# Patient Record
(demographics unavailable — no encounter records)

---

## 2024-11-18 NOTE — PHYSICAL EXAM
[Normal Mood and Affect] : normal mood and affect [Oriented] : oriented [Able to Communicate] : able to communicate [Well Developed] : well developed [Well Nourished] : well nourished [Right] : right shoulder [NL (0-180)] : full passive forward flexion 0-180 degrees [NL (0-90)] : full external rotation 0-90 degrees [NL (45)] : extension 45 degrees [Extension] : extension [Rotation to left] : rotation to left [Rotation to right] : rotation to right [4 ___] : forward flexion 4[unfilled]/5 [4___] : abduction 4[unfilled]/5 [5___] : external rotation 5[unfilled]/5 [Disc space narrowing] : Disc space narrowing [Bilateral] : shoulder bilaterally [There are no fractures, subluxations or dislocations. No significant abnormalities are seen] : There are no fractures, subluxations or dislocations. No significant abnormalities are seen [Type 3 acromion] : Type 3 acromion [FreeTextEntry1] : Mild DDD at C5-C6 [de-identified] : left lateral flexion 20 degrees [de-identified] : left lateral rotation 75 degrees [de-identified] : right lateral flexion 20 degrees [TWNoteComboBox6] : right lateral rotation 60 degrees [] : no AC joint tenderness [FreeTextEntry9] : IR T8 on the right, T6 on the left. [de-identified] : + Michelle

## 2024-11-18 NOTE — HISTORY OF PRESENT ILLNESS
[5] : 5 [Sharp] : sharp [Meds] : meds [Full time] : Work status: full time [FreeTextEntry7] : bicept [de-identified] : 4/25/22 [de-identified] : dr guthrie [de-identified] : none [8] : 8 [de-identified] : 11/18/24:  Return visit for a 58 year old female here today complaining of recurrent  neck pain radiating down both arms to both forearms x last 1 months duration. Constant and daily. Taking motrin 600mg prn with slight relief only. Having wake up pain at night.  Has been seeing pain management and underwent a JORGE A x2 this past 8/24 and 10/24 w/ little relief.          Last cortisone injection to the right shoulder in August of 2022.  08/11/22:  Returns today for right shoulder MRI results. C/o recurring rt shoulder pain since her cortisone injection x 3 1/2 months ago. Pt is considering surgical repair.  MRI R Shoulder: 06/23/2022 Trace subacromial/subdeltoid bursitis.  Rotator cuff tendinosis as described above, with a small focal full-thickness nonretracted tear of the supraspinatus tendon, new compared to the MR study from April 2021.  04/25/22:  Returns today three months after right shoulder cortisone injection. Known to have partial RCT as per MRI in 4/21.  01/27/22: Returns today with recurrent right shoulder pain X last 2-3 weeks about nine months after cortisone injection. has been moving and lifting boxes which hurts.  4/26/21 Here for MRI rt shoulder results. Now 2 weeks s/p cortisone injection. had only partial relief this time. Can't lie on rt side at night. NO wake up pain at night.  04/12/21: Returns today for left knee MRI results. Still has discomfort along the medial joint line. Also here with recurrent right shoulder pain with cortisone injections given to the right shoulder and left knee about three months ago. Rt shoulder did well following cortisone injection x 3 months ago and now pain has recurred x last 2 weeks ago. Can't lie on rt side at night. has occ. wake up pain.  01/14/21: Return visit for a 54 year old RHD female here today for spontaneous onset of right shoulder pain about two months ago. Pain when reaching overhead and when laying on that side. No injury or fall.   PMH: No previous right shoulder pain. Left shoulder pain a few months ago treated with cortisone injection.  4/19/19 Return visit for this 53 yo female c/o spon. onset of bilateral knee pain Lt>> rt x last 4 days duration assoc. w/ stairclimbing.Was limping. Was taking Naprosyn bid prn which helped. PMH: was having similar type pain x last 6 months duration which she was living with.  s/p meniscal tear rt knee x m any years ago. Never operated. [] : no [FreeTextEntry1] : right shoulder [FreeTextEntry5] : Reports neck pain and shooting CHELO shoulder pain for 1 month, radiating pain down CHELO arms. Denies N/T. Had JORGE A on 10/17/24 with Dr. Payan, reports some relief. reoccurring headache for 6 weeks  [de-identified] : lifting

## 2024-11-18 NOTE — PHYSICAL EXAM
[Normal Mood and Affect] : normal mood and affect [Oriented] : oriented [Able to Communicate] : able to communicate [Well Developed] : well developed [Well Nourished] : well nourished [Right] : right shoulder [NL (0-180)] : full passive forward flexion 0-180 degrees [NL (0-90)] : full external rotation 0-90 degrees [NL (45)] : extension 45 degrees [Extension] : extension [Rotation to left] : rotation to left [Rotation to right] : rotation to right [4 ___] : forward flexion 4[unfilled]/5 [4___] : abduction 4[unfilled]/5 [5___] : external rotation 5[unfilled]/5 [Disc space narrowing] : Disc space narrowing [Bilateral] : shoulder bilaterally [There are no fractures, subluxations or dislocations. No significant abnormalities are seen] : There are no fractures, subluxations or dislocations. No significant abnormalities are seen [Type 3 acromion] : Type 3 acromion [FreeTextEntry1] : Mild DDD at C5-C6 [de-identified] : left lateral flexion 20 degrees [de-identified] : left lateral rotation 75 degrees [de-identified] : right lateral flexion 20 degrees [TWNoteComboBox6] : right lateral rotation 60 degrees [] : no AC joint tenderness [FreeTextEntry9] : IR T8 on the right, T6 on the left. [de-identified] : + Michelle

## 2024-11-18 NOTE — HISTORY OF PRESENT ILLNESS
[5] : 5 [Sharp] : sharp [Meds] : meds [Full time] : Work status: full time [FreeTextEntry7] : bicept [de-identified] : 4/25/22 [de-identified] : dr guthrie [de-identified] : none [8] : 8 [de-identified] : 11/18/24:  Return visit for a 58 year old female here today complaining of recurrent  neck pain radiating down both arms to both forearms x last 1 months duration. Constant and daily. Taking motrin 600mg prn with slight relief only. Having wake up pain at night.  Has been seeing pain management and underwent a JORGE A x2 this past 8/24 and 10/24 w/ little relief.          Last cortisone injection to the right shoulder in August of 2022.  08/11/22:  Returns today for right shoulder MRI results. C/o recurring rt shoulder pain since her cortisone injection x 3 1/2 months ago. Pt is considering surgical repair.  MRI R Shoulder: 06/23/2022 Trace subacromial/subdeltoid bursitis.  Rotator cuff tendinosis as described above, with a small focal full-thickness nonretracted tear of the supraspinatus tendon, new compared to the MR study from April 2021.  04/25/22:  Returns today three months after right shoulder cortisone injection. Known to have partial RCT as per MRI in 4/21.  01/27/22: Returns today with recurrent right shoulder pain X last 2-3 weeks about nine months after cortisone injection. has been moving and lifting boxes which hurts.  4/26/21 Here for MRI rt shoulder results. Now 2 weeks s/p cortisone injection. had only partial relief this time. Can't lie on rt side at night. NO wake up pain at night.  04/12/21: Returns today for left knee MRI results. Still has discomfort along the medial joint line. Also here with recurrent right shoulder pain with cortisone injections given to the right shoulder and left knee about three months ago. Rt shoulder did well following cortisone injection x 3 months ago and now pain has recurred x last 2 weeks ago. Can't lie on rt side at night. has occ. wake up pain.  01/14/21: Return visit for a 54 year old RHD female here today for spontaneous onset of right shoulder pain about two months ago. Pain when reaching overhead and when laying on that side. No injury or fall.   PMH: No previous right shoulder pain. Left shoulder pain a few months ago treated with cortisone injection.  4/19/19 Return visit for this 51 yo female c/o spon. onset of bilateral knee pain Lt>> rt x last 4 days duration assoc. w/ stairclimbing.Was limping. Was taking Naprosyn bid prn which helped. PMH: was having similar type pain x last 6 months duration which she was living with.  s/p meniscal tear rt knee x m any years ago. Never operated. [] : no [FreeTextEntry1] : right shoulder [FreeTextEntry5] : Reports neck pain and shooting CHELO shoulder pain for 1 month, radiating pain down CHELO arms. Denies N/T. Had JORGE A on 10/17/24 with Dr. Payan, reports some relief. reoccurring headache for 6 weeks  [de-identified] : lifting

## 2024-11-18 NOTE — PLAN
[TextEntry] : We discussed at length with the patient the options for treatment.  We discussed conservative care including physical therapy, acupuncture, massage therapy, and chiropractic care.  We discussed injection therapy and even surgical intervention should the patient fail conservative care.  We discussed risks, benefits, complications, alternatives, outcomes, expectations.  All questions answered.  Medrol dose pack was prescribed. Risks and benefits were explained including but not limited to the possibilities of gastritis, indigestion, and other GI side effects. It was also explained that in patients with a history of diabetes mellitus, it may temporarily elevate their blood sugars which should be monitored at home. A refill was also prescribed to take the subsequent week if needed.  Patient is being referred for physical therapy for various modalities.  Will obtain an MRI C-spine  If no improvement, refer to pain management.

## 2024-12-02 NOTE — PHYSICAL EXAM
[Normal Mood and Affect] : normal mood and affect [Oriented] : oriented [Able to Communicate] : able to communicate [Well Developed] : well developed [Well Nourished] : well nourished [NL (45)] : extension 45 degrees [Extension] : extension [Rotation to left] : rotation to left [Rotation to right] : rotation to right [Disc space narrowing] : Disc space narrowing [Right] : right shoulder [NL (0-180)] : full passive forward flexion 0-180 degrees [NL (0-90)] : full external rotation 0-90 degrees [4 ___] : forward flexion 4[unfilled]/5 [4___] : abduction 4[unfilled]/5 [5___] : external rotation 5[unfilled]/5 [Bilateral] : shoulder bilaterally [There are no fractures, subluxations or dislocations. No significant abnormalities are seen] : There are no fractures, subluxations or dislocations. No significant abnormalities are seen [Type 3 acromion] : Type 3 acromion [FreeTextEntry1] : Mild DDD at C5-C6 [de-identified] : left lateral flexion 20 degrees [de-identified] : left lateral rotation 75 degrees [de-identified] : right lateral flexion 20 degrees [TWNoteComboBox6] : right lateral rotation 60 degrees [] : no AC joint tenderness [FreeTextEntry9] : IR T8 on the right, T6 on the left. [de-identified] : + Michelle

## 2024-12-02 NOTE — PLAN
[TextEntry] : We discussed at length with the patient the options for treatment.  We discussed conservative care including physical therapy, acupuncture, massage therapy, and chiropractic care.  We discussed injection therapy and even surgical intervention should the patient fail conservative care.  We discussed risks, benefits, complications, alternatives, outcomes, expectations.  All questions answered.  Repeat MDP prn  Patient is being referred for physical therapy for various modalities.  If no improvement, refer to pain management.  Continue flexeril prn

## 2024-12-02 NOTE — HISTORY OF PRESENT ILLNESS
[8] : 8 [Sharp] : sharp [Meds] : meds [Full time] : Work status: full time [de-identified] : 12/02/24:   Here for cervical spine MRI results. Feeling better after taking a  MDP x 1. Has not restarted PT yet. Taking Flexeril prn at night which helps as well.  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 Constant and daily. Taking motrin 600mg prn with slight relief only. Having wake up pain at night.  Has been seeing pain management and underwent a JORGE A x2 this past 8/24 and 10/24 w/ little relief.          Last cortisone injection to the right shoulder in August of 2022.  08/11/22:  Returns today for right shoulder MRI results. C/o recurring rt shoulder pain since her cortisone injection x 3 1/2 months ago. Pt is considering surgical repair.  MRI R Shoulder: 06/23/2022 Trace subacromial/subdeltoid bursitis.  Rotator cuff tendinosis as described above, with a small focal full-thickness nonretracted tear of the supraspinatus tendon, new compared to the MR study from April 2021.  04/25/22:  Returns today three months after right shoulder cortisone injection. Known to have partial RCT as per MRI in 4/21.  01/27/22: Returns today with recurrent right shoulder pain X last 2-3 weeks about nine months after cortisone injection. has been moving and lifting boxes which hurts.  4/26/21 Here for MRI rt shoulder results. Now 2 weeks s/p cortisone injection. had only partial relief this time. Can't lie on rt side at night. NO wake up pain at night.  04/12/21: Returns today for left knee MRI results. Still has discomfort along the medial joint line. Also here with recurrent right shoulder pain with cortisone injections given to the right shoulder and left knee about three months ago. Rt shoulder did well following cortisone injection x 3 months ago and now pain has recurred x last 2 weeks ago. Can't lie on rt side at night. has occ. wake up pain.  01/14/21: Return visit for a 54 year old RHD female here today for spontaneous onset of right shoulder pain about two months ago. Pain when reaching overhead and when laying on that side. No injury or fall.   PMH: No previous right shoulder pain. Left shoulder pain a few months ago treated with cortisone injection.  4/19/19 Return visit for this 53 yo female c/o spon. onset of bilateral knee pain Lt>> rt x last 4 days duration assoc. w/ stairclimbing.Was limping. Was taking Naprosyn bid prn which helped. PMH: was having similar type pain x last 6 months duration which she was living with.  s/p meniscal tear rt knee x m any years ago. Never operated. [] : no [FreeTextEntry1] : right shoulder [FreeTextEntry5] : Reports neck pain and shooting CHELO shoulder pain for 1 month, radiating pain down CHELO arms. Denies N/T. Had JORGE A on 10/17/24 with Dr. Payan, reports some relief. reoccurring headache for 6 weeks  [de-identified] : lifting

## 2025-03-10 NOTE — HISTORY OF PRESENT ILLNESS
[Neck] : neck [7] : 7 [2] : 2 [Dull/Aching] : dull/aching [Constant] : constant [Sleep] : sleep [Ice] : ice [Heat] : heat [Bending forward] : bending forward [Full time] : Work status: full time [] : no [FreeTextEntry1] : BOTH SHOULDERS [FreeTextEntry9] : ANTI-INFLAMMATORY [de-identified] : ROTATION ON HEAD [de-identified] : 11/2022 [de-identified] : 10.2024 [TWNoteComboBox1] : 70%

## 2025-03-10 NOTE — ASSESSMENT
[FreeTextEntry1] : Interim history The patient returns with pain that has been treated adequately in the past with epidural steroid injections.  The patient's complaints are consistent with radiculopathy. Patient's ADL's increase with prior SUDHIR.   The last injection gave greater than 60% reduction of the pain but now there is a return of symptoms. The patient is requesting a subsequent epidural steroid injection to alleviate pain and improve functional ability and quality of life.  Patient is a candidate. Objective findings Since the last visit, there have been no new imaging studies. The patient has no new symptoms except the return of the their pain complaints. Motor and sensory function is unchanged. Plan Spoke to patient about epidural steroid injections. Explained risks, benefits and alternatives including but not limited to the risk of infection, bleeding, headache, syncopal episode, failure to resolve issues, allergic reaction, symptom recurrence, allergic reaction, nerve injury, and increased pain. The patient understands the risks. All questions were answered. The patient is willing to proceed.  This note was generated by using Dragon medical dictation software.  A reasonable effort has been made for proofreading its contents, but typos may still remain.  If there are any questions or points of clarification needed, please notify my office.

## 2025-03-26 NOTE — HISTORY OF PRESENT ILLNESS
[de-identified] : 03/26/2025:  couple months of foot pain. saw dpm who tx w/ boot, csi and eswt w/o relief. presents with mri and labs (pos for MARIAJOSE). has not been to see room. also w/ recent L pain. c/o polyarthralgia as well. no prior foot surgery. denies dm/tob.. . c/o n/t at R foot as well  [] : no [FreeTextEntry1] : right ankle [de-identified] : XR/MRI

## 2025-03-26 NOTE — ASSESSMENT
Detail Level: Detailed [FreeTextEntry1] : wbat PT emg nsaids prn has rheum eval pending further plan pending EMG

## 2025-03-26 NOTE — DATA REVIEWED
[MRI] : MRI [Right] : of the right [Ankle] : ankle [I reviewed the films/CD and additionally noted] : I reviewed the films/CD and additionally noted [FreeTextEntry1] : plantar fasciitis -- patriciaanger

## 2025-03-26 NOTE — PHYSICAL EXAM
[Right] : right foot and ankle [Left] : left foot and ankle [NL (40)] : plantar flexion 40 degrees [NL 30)] : inversion 30 degrees [NL (20)] : eversion 20 degrees [5___] : eversion 5[unfilled]/5 [2+] : dorsalis pedis pulse: 2+ [] : Sensation present to light touch in all distributions [FreeTextEntry8] : subjective tingling on palpation

## 2025-04-02 NOTE — DATA REVIEWED
[MRI] : MRI [Right] : of the right [Ankle] : ankle [I reviewed the films/CD and additionally noted] : I reviewed the films/CD and additionally noted [EMG Nerve Conduction] : A EMG Nerve Conduction test was completed of the [Bilateral] : bilateral [Lower extremity] : lower extremity [Positive] : positive [Consistent with radiculopathy] : consistent with radiculopathy [FreeTextEntry1] : plantar fasciitis -- patriciaanger

## 2025-04-02 NOTE — HISTORY OF PRESENT ILLNESS
[de-identified] : 03/26/2025:  couple months of foot pain. saw dpm who tx w/ boot, csi and eswt w/o relief. presents with mri and labs (pos for MARIAJOSE). has not been to see rheum. also w/ recent L pain. c/o polyarthralgia as well. no prior foot surgery. denies dm/tob.. . c/o n/t at R foot as well  04/02/2025: emg f/up. no sig change. emg w/ lumbar radic [] : no [FreeTextEntry1] : right ankle [de-identified] : XR/MRI

## 2025-04-02 NOTE — PHYSICAL EXAM
[Right] : right foot and ankle [NL (40)] : plantar flexion 40 degrees [NL 30)] : inversion 30 degrees [NL (20)] : eversion 20 degrees [5___] : eversion 5[unfilled]/5 [2+] : dorsalis pedis pulse: 2+ [] : negative Tinel [FreeTextEntry8] : subjective tingling on palpation

## 2025-04-23 NOTE — ASSESSMENT
ED Provider Note    CHIEF COMPLAINT  Chief Complaint   Patient presents with    Head Injury     Trip and fall last night at approx 1930. Reports she hit head on cement and presents with ecchymosis to L orbital area. Reports also h/a and + LOC. Denies emesis or blood thinners.         HPI/ROS    Gabriela Carrillo is a 56 y.o. female who presents with a headache.  The patient had a mechanical fall last night around 7:30 PM.  She tripped over per striking the left side of her forehead and orbit on the cement.  She did have a brief loss of consciousness according to her .  She presents with ecchymosis to the left eye as well as some periodic blurred vision.  She does not have any diplopia.  She does have a headache.  She also has some diffuse neck pain with no radicular component.  She does not have any paresthesias nor functional loss of her extremities.  She is unaware of any trauma to the torso.  She does not take any anticoagulants.    PAST MEDICAL HISTORY   has a past medical history of Bilateral ovarian cysts, Hypertension, Murmur, cardiac, Osteopenia, and Snoring.    SURGICAL HISTORY   has a past surgical history that includes gyn laparoscopy (2020); open intern fix anter pelv ring fx (Right, 2/7/2023); repair collat ankle ligmnt,secondary (1/3/2024); open tx distal tibiofibular joint disruption (Left, 1/3/2024); release tib/fib/ankle flex tendon,ea (Left, 1/3/2024); and ankle arthroscopy (Left, 1/3/2024).    FAMILY HISTORY  Family History   Problem Relation Age of Onset    Cancer Mother         Lung    Lung Disease Mother     Heart Disease Mother     Hypertension Mother     Cancer Father     Lung Disease Father     Heart Disease Father     Hypertension Father     Heart Disease Brother     Psychiatric Illness Brother     Neuromuscular disorder Brother        SOCIAL HISTORY  Social History     Tobacco Use    Smoking status: Never    Smokeless tobacco: Never   Vaping Use    Vaping status: Never Used  [FreeTextEntry1] : 58 F with LBP and RLE radic.  L5- s1 radic on EMG MRI L spine to asses for HNP on NSAIDs already Trial of gabapeitn TIral of PT FU after MRI  "  Substance and Sexual Activity    Alcohol use: Yes     Alcohol/week: 1.2 oz     Types: 2 Cans of beer per week     Comment: 2-4 a week    Drug use: Never    Sexual activity: Not on file       CURRENT MEDICATIONS  Home Medications       Reviewed by Ameena Mcduffie R.N. (Registered Nurse) on 08/11/24 at 1358  Med List Status: Not Addressed     Medication Last Dose Status   acetaminophen (TYLENOL) 500 MG Tab  Active   acetaminophen (TYLENOL) 500 MG Tab  Active   acetaminophen (TYLENOL) 500 MG Tab  Active   alendronate (FOSAMAX) 70 MG Tab  Active   Ascorbic Acid (VITAMIN C) 1000 MG Tab  Active   Calcium Carbonate-Vit D-Min (CALCIUM 1200 PO)  Active   Cyanocobalamin (VITAMIN B 12 PO)  Active   escitalopram (LEXAPRO) 10 MG Tab  Active   gabapentin (NEURONTIN) 100 MG Cap  Active   gabapentin (NEURONTIN) 100 MG Cap  Active   gabapentin (NEURONTIN) 300 MG Cap  Active   gabapentin (NEURONTIN) 300 MG Cap  Active   hydrochlorothiazide (MICROZIDE) 12.5 MG capsule  Active   hydrOXYzine pamoate (VISTARIL) 25 MG Cap  Active   hydrOXYzine pamoate (VISTARIL) 25 MG Cap  Active   ibuprofen (MOTRIN) 600 MG Tab  Active   ibuprofen (MOTRIN) 800 MG Tab  Active   lidocaine (XYLOCAINE) 2 % Solution  Active   meloxicam (MOBIC) 15 MG tablet  Active   olmesartan (BENICAR) 20 MG Tab  Active                    ALLERGIES  No Known Allergies    PHYSICAL EXAM  VITAL SIGNS: BP (!) 130/90   Pulse 67   Temp 36.4 °C (97.5 °F) (Temporal)   Resp 18   Ht 1.549 m (5' 1\")   Wt 72.1 kg (159 lb)   SpO2 97%   BMI 30.04 kg/m²    In general the patient appears uncomfortable    Head is normocephalic atraumatic, facial examination the patient does have periorbital ecchymosis and swelling    Eyes pupils are 3 and reactive bilaterally and extract motors intact with no evidence of entrapment    Nares and mouth are moist without signs of trauma, ears TMs intact without hemotympanums    Cervical spine does have some mild diffuse tenderness there is no midline " tenderness in the thoracic nor lumbar region    Pulmonary the patient's lungs are clear to auscultation bilaterally    Cardiovascular S1-S2 with a regular rate and rhythm    GI abdomen soft    Skin the periorbital ecchymosis described above    Neurologic examination GCS of 15      RADIOLOGY/PROCEDURES   I have independently interpreted the diagnostic imaging associated with this visit and am waiting the final reading from the radiologist.   My preliminary interpretation is as follows: Reviewed CT scan of the head and there is no acute skull fracture nor intracranial hemorrhage    Radiologist interpretation:  CT-MAXILLOFACIAL W/O PLUS RECONS   Final Result      Negative maxillofacial/paranasal sinuses CT scan without contrast.      CT-CSPINE WITHOUT PLUS RECONS   Final Result      No fracture detected.      CT-HEAD W/O   Final Result         Head CT without contrast within normal limits. No evidence of acute cerebral infarction, hemorrhage or mass lesion.                   COURSE & MEDICAL DECISION MAKING    This a 56-year-old female who presents the emergency department for evaluation after a fall.  She did have a loss of consciousness and a CT scan of the head was performed and there is no evidence of intracranial hemorrhage.  CT scan of the maxillofacial bones showed no orbital fracture and clinically she does not have any evidence of entrapment.  She did have some mild neck pain and therefore CT scan of the cervical spine was performed as well and there is no acute fracture.  Therefore suspect the patient does have a concussion as well as a left orbital contusion and cervical strain.  The patient will be treated supportively with anti-inflammatories.  She will return to the emergency department if she is acutely worse with increased pain or she does not have significant improvement in 4 to 5 days.    FINAL DIAGNOSIS  1.  Concussion with loss of consciousness  2.  Left orbital contusion  3.  Cervical  strain    Disposition  The patient will be discharged in stable condition     Electronically signed by: Jorge Marvin M.D., 8/11/2024 2:24 PM

## 2025-04-23 NOTE — HISTORY OF PRESENT ILLNESS
[Neck] : neck [de-identified] : 04/23/2025: 58-year-old female presents with low back pain and Rt foot pain. She was evaluated by Dr. Montero, Has Hx of heel spurs and plantar fascitits, reports short term relief with CSI and PT. She reports heel and ankle pain. EMG completed at [facility] on 3/31/25 revealing mild and chronic Right sided S1>L5 radiculopathy. Symptoms in foot are present at all times, worsen with ambulation. Patient has low back pain that worsens with activity.   Hx of Neck pain which she manages with JORGE A by Dr. Payan.   PmHx: + AMRIAJOSE under rheum eval All: PCN (Hives, anaphylaxis) Occupation: Exec assistant

## 2025-04-23 NOTE — IMAGING
[de-identified] : X-ray Ap/Lateral/Flexion/Extension of lumbar spine were viewed and interpreted.  Normal alignment is maintained without any spondylolisthesis.